# Patient Record
Sex: FEMALE | Race: BLACK OR AFRICAN AMERICAN | NOT HISPANIC OR LATINO | Employment: FULL TIME | URBAN - METROPOLITAN AREA
[De-identification: names, ages, dates, MRNs, and addresses within clinical notes are randomized per-mention and may not be internally consistent; named-entity substitution may affect disease eponyms.]

---

## 2023-10-30 ENCOUNTER — OFFICE VISIT (OUTPATIENT)
Dept: PRIMARY CARE | Facility: CLINIC | Age: 73
End: 2023-10-30
Payer: COMMERCIAL

## 2023-10-30 DIAGNOSIS — N95.1 MENOPAUSAL STATE: ICD-10-CM

## 2023-10-30 DIAGNOSIS — Z12.31 VISIT FOR SCREENING MAMMOGRAM: ICD-10-CM

## 2023-10-30 DIAGNOSIS — Z23 NEED FOR IMMUNIZATION AGAINST INFLUENZA: Primary | ICD-10-CM

## 2023-10-30 DIAGNOSIS — M81.0 POSTMENOPAUSAL OSTEOPOROSIS: Primary | ICD-10-CM

## 2023-10-30 PROBLEM — R63.4 UNINTENDED WEIGHT LOSS: Status: RESOLVED | Noted: 2023-02-07 | Resolved: 2023-10-30

## 2023-10-30 PROBLEM — J20.9 ACUTE BRONCHITIS: Status: ACTIVE | Noted: 2023-10-30

## 2023-10-30 PROBLEM — L30.9 ECZEMA: Status: RESOLVED | Noted: 2023-10-30 | Resolved: 2023-10-30

## 2023-10-30 PROBLEM — R09.82 POSTNASAL DRIP: Status: RESOLVED | Noted: 2023-10-30 | Resolved: 2023-10-30

## 2023-10-30 PROBLEM — M19.071 PRIMARY OSTEOARTHRITIS OF RIGHT ANKLE: Status: ACTIVE | Noted: 2023-10-30

## 2023-10-30 PROBLEM — E55.9 VITAMIN D INSUFFICIENCY: Status: ACTIVE | Noted: 2023-10-30

## 2023-10-30 PROBLEM — R73.09 ABNORMAL GLUCOSE: Status: RESOLVED | Noted: 2023-10-30 | Resolved: 2023-10-30

## 2023-10-30 PROBLEM — R49.0 HOARSENESS OF VOICE: Status: RESOLVED | Noted: 2023-10-30 | Resolved: 2023-10-30

## 2023-10-30 PROBLEM — M54.12 LEFT CERVICAL RADICULOPATHY: Status: ACTIVE | Noted: 2023-10-30

## 2023-10-30 PROBLEM — J44.9 OBSTRUCTIVE LUNG DISEASE (GENERALIZED) (MULTI): Status: ACTIVE | Noted: 2023-10-23

## 2023-10-30 PROBLEM — E03.9 HYPOTHYROID: Status: ACTIVE | Noted: 2023-10-30

## 2023-10-30 PROBLEM — M79.671 RIGHT FOOT PAIN: Status: RESOLVED | Noted: 2023-10-30 | Resolved: 2023-10-30

## 2023-10-30 PROBLEM — E66.811 OBESITY (BMI 30.0-34.9): Status: ACTIVE | Noted: 2023-10-30

## 2023-10-30 PROBLEM — M25.471 RIGHT ANKLE SWELLING: Status: RESOLVED | Noted: 2023-10-30 | Resolved: 2023-10-30

## 2023-10-30 PROBLEM — K21.9 LPRD (LARYNGOPHARYNGEAL REFLUX DISEASE): Status: ACTIVE | Noted: 2017-07-06

## 2023-10-30 PROBLEM — U09.9 CHRONIC POST-COVID-19 SYNDROME: Status: RESOLVED | Noted: 2023-02-07 | Resolved: 2023-10-30

## 2023-10-30 PROBLEM — J84.9 ILD (INTERSTITIAL LUNG DISEASE) (MULTI): Status: ACTIVE | Noted: 2023-10-23

## 2023-10-30 PROBLEM — R49.0 MUSCLE TENSION DYSPHONIA: Status: ACTIVE | Noted: 2023-10-30

## 2023-10-30 PROBLEM — I10 HTN (HYPERTENSION): Status: ACTIVE | Noted: 2023-10-30

## 2023-10-30 PROBLEM — S93.401A RIGHT ANKLE SPRAIN: Status: RESOLVED | Noted: 2023-10-30 | Resolved: 2023-10-30

## 2023-10-30 PROBLEM — R05.9 COUGH: Status: RESOLVED | Noted: 2023-10-30 | Resolved: 2023-10-30

## 2023-10-30 PROBLEM — J06.9 UPPER RESPIRATORY INFECTION: Status: RESOLVED | Noted: 2023-10-30 | Resolved: 2023-10-30

## 2023-10-30 PROBLEM — D25.1 INTRAMURAL LEIOMYOMA OF UTERUS: Status: ACTIVE | Noted: 2023-10-30

## 2023-10-30 PROBLEM — K21.9 GERD (GASTROESOPHAGEAL REFLUX DISEASE): Status: ACTIVE | Noted: 2023-10-30

## 2023-10-30 PROBLEM — M25.571 RIGHT ANKLE PAIN: Status: RESOLVED | Noted: 2023-10-30 | Resolved: 2023-10-30

## 2023-10-30 PROBLEM — E66.9 OBESITY (BMI 30.0-34.9): Status: ACTIVE | Noted: 2023-10-30

## 2023-10-30 PROBLEM — M25.561 RIGHT KNEE PAIN: Status: RESOLVED | Noted: 2023-10-30 | Resolved: 2023-10-30

## 2023-10-30 PROCEDURE — 3075F SYST BP GE 130 - 139MM HG: CPT | Performed by: INTERNAL MEDICINE

## 2023-10-30 PROCEDURE — 90662 IIV NO PRSV INCREASED AG IM: CPT | Performed by: INTERNAL MEDICINE

## 2023-10-30 PROCEDURE — 1159F MED LIST DOCD IN RCRD: CPT | Performed by: INTERNAL MEDICINE

## 2023-10-30 PROCEDURE — 3079F DIAST BP 80-89 MM HG: CPT | Performed by: INTERNAL MEDICINE

## 2023-10-30 PROCEDURE — 99211 OFF/OP EST MAY X REQ PHY/QHP: CPT | Performed by: INTERNAL MEDICINE

## 2023-10-30 PROCEDURE — 1126F AMNT PAIN NOTED NONE PRSNT: CPT | Performed by: INTERNAL MEDICINE

## 2023-10-30 PROCEDURE — G0008 ADMIN INFLUENZA VIRUS VAC: HCPCS | Performed by: INTERNAL MEDICINE

## 2023-10-30 RX ORDER — ALBUTEROL SULFATE 90 UG/1
AEROSOL, METERED RESPIRATORY (INHALATION)
COMMUNITY

## 2023-10-30 RX ORDER — LEVOTHYROXINE SODIUM 75 UG/1
75 TABLET ORAL
COMMUNITY
Start: 2014-01-16 | End: 2024-03-14

## 2023-10-30 RX ORDER — TRIAMTERENE/HYDROCHLOROTHIAZID 37.5-25 MG
1 TABLET ORAL DAILY
COMMUNITY
Start: 2023-01-19 | End: 2024-05-06

## 2023-10-30 RX ORDER — OMEPRAZOLE 20 MG/1
20 CAPSULE, DELAYED RELEASE ORAL
COMMUNITY
Start: 2023-09-08 | End: 2023-12-07

## 2023-10-30 RX ORDER — SODIUM CHLORIDE 0.65 %
1 AEROSOL, SPRAY (ML) NASAL AS NEEDED
COMMUNITY
Start: 2023-08-25 | End: 2023-11-23

## 2023-10-30 RX ORDER — ALBUTEROL SULFATE 0.83 MG/ML
2.5 SOLUTION RESPIRATORY (INHALATION)
COMMUNITY
Start: 2023-08-25

## 2023-10-30 RX ORDER — FLUTICASONE PROPIONATE 50 MCG
1 SPRAY, SUSPENSION (ML) NASAL DAILY
COMMUNITY
Start: 2019-07-17

## 2023-10-30 RX ORDER — BUDESONIDE AND FORMOTEROL FUMARATE DIHYDRATE 160; 4.5 UG/1; UG/1
2 AEROSOL RESPIRATORY (INHALATION)
COMMUNITY
Start: 2022-06-27

## 2023-10-30 RX ORDER — PREDNISONE 20 MG/1
20 TABLET ORAL DAILY
COMMUNITY
Start: 2023-09-08 | End: 2024-05-30 | Stop reason: ALTCHOICE

## 2023-10-30 RX ORDER — ALBUTEROL SULFATE 90 UG/1
2 AEROSOL, METERED RESPIRATORY (INHALATION) EVERY 6 HOURS PRN
COMMUNITY
Start: 2022-05-26 | End: 2024-05-30 | Stop reason: SDUPTHER

## 2023-10-30 RX ORDER — MONTELUKAST SODIUM 10 MG/1
1 TABLET ORAL NIGHTLY
COMMUNITY
Start: 2023-08-21 | End: 2024-05-30 | Stop reason: ALTCHOICE

## 2023-10-30 RX ORDER — MYCOPHENOLATE MOFETIL 500 MG/1
1000 TABLET ORAL 2 TIMES DAILY
COMMUNITY
Start: 2023-10-27 | End: 2024-01-25

## 2023-11-01 ENCOUNTER — TELEPHONE (OUTPATIENT)
Dept: PRIMARY CARE | Facility: CLINIC | Age: 73
End: 2023-11-01
Payer: COMMERCIAL

## 2023-11-01 DIAGNOSIS — R53.82 CHRONIC FATIGUE: ICD-10-CM

## 2023-11-01 DIAGNOSIS — E78.00 HYPERCHOLESTEROLEMIA: ICD-10-CM

## 2023-11-01 DIAGNOSIS — E03.9 ACQUIRED HYPOTHYROIDISM: Primary | ICD-10-CM

## 2023-11-01 DIAGNOSIS — I10 PRIMARY HYPERTENSION: ICD-10-CM

## 2023-11-30 ENCOUNTER — OFFICE VISIT (OUTPATIENT)
Dept: PRIMARY CARE | Facility: CLINIC | Age: 73
End: 2023-11-30
Payer: COMMERCIAL

## 2023-11-30 VITALS
RESPIRATION RATE: 16 BRPM | HEIGHT: 63 IN | HEART RATE: 101 BPM | WEIGHT: 164.24 LBS | BODY MASS INDEX: 29.1 KG/M2 | SYSTOLIC BLOOD PRESSURE: 130 MMHG | DIASTOLIC BLOOD PRESSURE: 88 MMHG | OXYGEN SATURATION: 98 %

## 2023-11-30 DIAGNOSIS — E03.9 ACQUIRED HYPOTHYROIDISM: ICD-10-CM

## 2023-11-30 DIAGNOSIS — M25.561 CHRONIC PAIN OF RIGHT KNEE: ICD-10-CM

## 2023-11-30 DIAGNOSIS — R35.0 INCREASED URINARY FREQUENCY: ICD-10-CM

## 2023-11-30 DIAGNOSIS — J84.9 ILD (INTERSTITIAL LUNG DISEASE) (MULTI): ICD-10-CM

## 2023-11-30 DIAGNOSIS — I10 PRIMARY HYPERTENSION: Primary | ICD-10-CM

## 2023-11-30 DIAGNOSIS — G89.29 CHRONIC PAIN OF RIGHT KNEE: ICD-10-CM

## 2023-11-30 LAB
POC APPEARANCE, URINE: CLEAR
POC BILIRUBIN, URINE: NEGATIVE
POC BLOOD, URINE: NEGATIVE
POC COLOR, URINE: YELLOW
POC GLUCOSE, URINE: NEGATIVE MG/DL
POC KETONES, URINE: NEGATIVE MG/DL
POC LEUKOCYTES, URINE: NEGATIVE
POC NITRITE,URINE: NEGATIVE
POC PH, URINE: 7 PH
POC PROTEIN, URINE: NEGATIVE MG/DL
POC SPECIFIC GRAVITY, URINE: 1.01
POC UROBILINOGEN, URINE: 0.2 EU/DL

## 2023-11-30 PROCEDURE — 81003 URINALYSIS AUTO W/O SCOPE: CPT | Performed by: INTERNAL MEDICINE

## 2023-11-30 PROCEDURE — 1036F TOBACCO NON-USER: CPT | Performed by: INTERNAL MEDICINE

## 2023-11-30 PROCEDURE — 99214 OFFICE O/P EST MOD 30 MIN: CPT | Performed by: INTERNAL MEDICINE

## 2023-11-30 PROCEDURE — 3079F DIAST BP 80-89 MM HG: CPT | Performed by: INTERNAL MEDICINE

## 2023-11-30 PROCEDURE — 1159F MED LIST DOCD IN RCRD: CPT | Performed by: INTERNAL MEDICINE

## 2023-11-30 PROCEDURE — 1160F RVW MEDS BY RX/DR IN RCRD: CPT | Performed by: INTERNAL MEDICINE

## 2023-11-30 PROCEDURE — 1126F AMNT PAIN NOTED NONE PRSNT: CPT | Performed by: INTERNAL MEDICINE

## 2023-11-30 PROCEDURE — 3075F SYST BP GE 130 - 139MM HG: CPT | Performed by: INTERNAL MEDICINE

## 2023-11-30 ASSESSMENT — PAIN SCALES - GENERAL: PAINLEVEL: 0-NO PAIN

## 2023-11-30 ASSESSMENT — ENCOUNTER SYMPTOMS
DEPRESSION: 0
LOSS OF SENSATION IN FEET: 0
FREQUENCY: 1
HYPERTENSION: 1
OCCASIONAL FEELINGS OF UNSTEADINESS: 0

## 2023-11-30 NOTE — PROGRESS NOTES
"Subjective   Patient ID: Zeinaboswaldo Barbour is a 73 y.o. female who presents for Hypertension and Urinary Frequency.    Follow up visit.  He has hypertension, GERD, pulmonary fibrosis, hypothyroidism.  Complains of urinary frequency.  Complains of right knee pain deformity occasional pain.    Hypertension    Urinary Frequency   Associated symptoms include frequency.        Review of Systems   Genitourinary:  Positive for frequency.   All other systems reviewed and are negative.      Objective   /88 (BP Location: Left arm, Patient Position: Sitting)   Pulse 101   Resp 16   Ht 1.605 m (5' 3.19\")   Wt 74.5 kg (164 lb 3.9 oz)   SpO2 98%   BMI 28.92 kg/m²     Physical Exam  Constitutional:       Appearance: Normal appearance.   HENT:      Head: Normocephalic and atraumatic.      Right Ear: External ear normal.      Left Ear: External ear normal.      Mouth/Throat:      Mouth: Mucous membranes are moist.      Pharynx: Oropharynx is clear.   Neck:      Vascular: No carotid bruit.   Cardiovascular:      Rate and Rhythm: Normal rate and regular rhythm.      Heart sounds: No murmur heard.     No gallop.   Pulmonary:      Effort: Pulmonary effort is normal. No respiratory distress.      Breath sounds: No wheezing or rales.   Abdominal:      General: Abdomen is flat.      Palpations: Abdomen is soft.      Hernia: No hernia is present.   Genitourinary:     Comments: Not examined  Musculoskeletal:         General: No swelling, tenderness, deformity or signs of injury.      Cervical back: No rigidity or tenderness.      Right lower leg: No edema.   Lymphadenopathy:      Cervical: No cervical adenopathy.   Skin:     Coloration: Skin is not jaundiced or pale.      Findings: No bruising, erythema, lesion or rash.   Neurological:      General: No focal deficit present.      Mental Status: She is oriented to person, place, and time.      Motor: No weakness.      Coordination: Coordination normal.   Psychiatric:         Mood " and Affect: Mood normal.         Behavior: Behavior normal.         Assessment/Plan   Problem List Items Addressed This Visit             ICD-10-CM    HTN (hypertension) - Primary I10     Hypertension : controlled blood pressure and continues same medications and educate a low salt diet do not exceed 200 mg per serving. Keep monitor the blood pressure at home.            Hypothyroid E03.9     Clinically euthyroid.  Check thyroid panel.  Continue same dose levothyroxine         ILD (interstitial lung disease) (CMS/HCC) J84.9     Follows up with pulmonary specialist at [facility].  Takes CellCept and prednisone.  Not happy about prednisone her appetite is increased cannot lose weight.         Increased urinary frequency R35.0     UA done in the office was normal.  Start Myrbetriq 25 mg daily at bedtime.  Samples given from the office.         Relevant Orders    POCT UA Automated manually resulted (Completed)     Other Visit Diagnoses         Codes    Chronic pain of right knee     M25.561, G89.29    Relevant Orders    XR knee right 1-2 views

## 2023-11-30 NOTE — ASSESSMENT & PLAN NOTE
UA done in the office was normal.  Start Myrbetriq 25 mg daily at bedtime.  Samples given from the office.

## 2023-11-30 NOTE — ASSESSMENT & PLAN NOTE
Follows up with pulmonary specialist at [facility].  Takes CellCept and prednisone.  Not happy about prednisone her appetite is increased cannot lose weight.

## 2023-11-30 NOTE — ASSESSMENT & PLAN NOTE
Hypertension : controlled blood pressure and continues same medications and educate a low salt diet do not exceed 200 mg per serving. Keep monitor the blood pressure at home.

## 2024-01-15 ENCOUNTER — TELEPHONE (OUTPATIENT)
Dept: PRIMARY CARE | Facility: CLINIC | Age: 74
End: 2024-01-15
Payer: COMMERCIAL

## 2024-01-15 DIAGNOSIS — R35.0 INCREASED URINARY FREQUENCY: Primary | ICD-10-CM

## 2024-01-17 ENCOUNTER — LAB (OUTPATIENT)
Dept: LAB | Facility: LAB | Age: 74
End: 2024-01-17
Payer: COMMERCIAL

## 2024-01-17 DIAGNOSIS — R53.82 CHRONIC FATIGUE: ICD-10-CM

## 2024-01-17 DIAGNOSIS — E03.9 ACQUIRED HYPOTHYROIDISM: ICD-10-CM

## 2024-01-17 DIAGNOSIS — I10 PRIMARY HYPERTENSION: ICD-10-CM

## 2024-01-17 DIAGNOSIS — E78.00 HYPERCHOLESTEROLEMIA: ICD-10-CM

## 2024-01-17 LAB
ANION GAP SERPL CALC-SCNC: 15 MMOL/L (ref 10–20)
BASOPHILS # BLD AUTO: 0.09 X10*3/UL (ref 0–0.1)
BASOPHILS NFR BLD AUTO: 0.9 %
BUN SERPL-MCNC: 9 MG/DL (ref 6–23)
CALCIUM SERPL-MCNC: 9.4 MG/DL (ref 8.6–10.6)
CHLORIDE SERPL-SCNC: 98 MMOL/L (ref 98–107)
CHOLEST SERPL-MCNC: 176 MG/DL (ref 0–199)
CHOLESTEROL/HDL RATIO: 3.1
CO2 SERPL-SCNC: 29 MMOL/L (ref 21–32)
CREAT SERPL-MCNC: 0.76 MG/DL (ref 0.5–1.05)
EGFRCR SERPLBLD CKD-EPI 2021: 83 ML/MIN/1.73M*2
EOSINOPHIL # BLD AUTO: 0.05 X10*3/UL (ref 0–0.4)
EOSINOPHIL NFR BLD AUTO: 0.5 %
ERYTHROCYTE [DISTWIDTH] IN BLOOD BY AUTOMATED COUNT: 12.9 % (ref 11.5–14.5)
GLUCOSE SERPL-MCNC: 80 MG/DL (ref 74–99)
HCT VFR BLD AUTO: 39.3 % (ref 36–46)
HDLC SERPL-MCNC: 56.3 MG/DL
HGB BLD-MCNC: 12.6 G/DL (ref 12–16)
IMM GRANULOCYTES # BLD AUTO: 0.16 X10*3/UL (ref 0–0.5)
IMM GRANULOCYTES NFR BLD AUTO: 1.7 % (ref 0–0.9)
LDLC SERPL CALC-MCNC: 95 MG/DL
LYMPHOCYTES # BLD AUTO: 1.58 X10*3/UL (ref 0.8–3)
LYMPHOCYTES NFR BLD AUTO: 16.6 %
MCH RBC QN AUTO: 30.8 PG (ref 26–34)
MCHC RBC AUTO-ENTMCNC: 32.1 G/DL (ref 32–36)
MCV RBC AUTO: 96 FL (ref 80–100)
MONOCYTES # BLD AUTO: 0.65 X10*3/UL (ref 0.05–0.8)
MONOCYTES NFR BLD AUTO: 6.8 %
NEUTROPHILS # BLD AUTO: 6.97 X10*3/UL (ref 1.6–5.5)
NEUTROPHILS NFR BLD AUTO: 73.5 %
NON HDL CHOLESTEROL: 120 MG/DL (ref 0–149)
NRBC BLD-RTO: 0 /100 WBCS (ref 0–0)
PLATELET # BLD AUTO: 311 X10*3/UL (ref 150–450)
POTASSIUM SERPL-SCNC: 3.4 MMOL/L (ref 3.5–5.3)
RBC # BLD AUTO: 4.09 X10*6/UL (ref 4–5.2)
SODIUM SERPL-SCNC: 139 MMOL/L (ref 136–145)
TRIGL SERPL-MCNC: 123 MG/DL (ref 0–149)
TSH SERPL-ACNC: 1.5 MIU/L (ref 0.44–3.98)
VLDL: 25 MG/DL (ref 0–40)
WBC # BLD AUTO: 9.5 X10*3/UL (ref 4.4–11.3)

## 2024-01-17 PROCEDURE — 80061 LIPID PANEL: CPT

## 2024-01-17 PROCEDURE — 80048 BASIC METABOLIC PNL TOTAL CA: CPT

## 2024-01-17 PROCEDURE — 84443 ASSAY THYROID STIM HORMONE: CPT

## 2024-01-17 PROCEDURE — 36415 COLL VENOUS BLD VENIPUNCTURE: CPT

## 2024-01-17 PROCEDURE — 85025 COMPLETE CBC W/AUTO DIFF WBC: CPT

## 2024-03-14 DIAGNOSIS — E03.9 ACQUIRED HYPOTHYROIDISM: Primary | ICD-10-CM

## 2024-03-14 RX ORDER — LEVOTHYROXINE SODIUM 75 UG/1
75 TABLET ORAL DAILY
Qty: 90 TABLET | Refills: 1 | Status: SHIPPED | OUTPATIENT
Start: 2024-03-14

## 2024-03-20 ENCOUNTER — TELEPHONE (OUTPATIENT)
Dept: PRIMARY CARE | Facility: CLINIC | Age: 74
End: 2024-03-20
Payer: COMMERCIAL

## 2024-05-06 DIAGNOSIS — I10 PRIMARY HYPERTENSION: Primary | ICD-10-CM

## 2024-05-06 RX ORDER — TRIAMTERENE/HYDROCHLOROTHIAZID 37.5-25 MG
1 TABLET ORAL DAILY
Qty: 90 TABLET | Refills: 1 | Status: SHIPPED | OUTPATIENT
Start: 2024-05-06

## 2024-05-30 ENCOUNTER — HOSPITAL ENCOUNTER (OUTPATIENT)
Dept: RADIOLOGY | Facility: CLINIC | Age: 74
Discharge: HOME | End: 2024-05-30
Payer: COMMERCIAL

## 2024-05-30 ENCOUNTER — OFFICE VISIT (OUTPATIENT)
Dept: PRIMARY CARE | Facility: CLINIC | Age: 74
End: 2024-05-30
Payer: COMMERCIAL

## 2024-05-30 VITALS
WEIGHT: 168.43 LBS | HEART RATE: 96 BPM | SYSTOLIC BLOOD PRESSURE: 129 MMHG | TEMPERATURE: 97.2 F | DIASTOLIC BLOOD PRESSURE: 70 MMHG | HEIGHT: 63 IN | RESPIRATION RATE: 16 BRPM | BODY MASS INDEX: 29.84 KG/M2 | OXYGEN SATURATION: 98 %

## 2024-05-30 DIAGNOSIS — G89.29 CHRONIC PAIN OF RIGHT KNEE: ICD-10-CM

## 2024-05-30 DIAGNOSIS — Z78.0 POSTMENOPAUSAL: ICD-10-CM

## 2024-05-30 DIAGNOSIS — J44.9 OBSTRUCTIVE LUNG DISEASE (GENERALIZED) (MULTI): ICD-10-CM

## 2024-05-30 DIAGNOSIS — M25.561 CHRONIC PAIN OF RIGHT KNEE: ICD-10-CM

## 2024-05-30 DIAGNOSIS — I10 PRIMARY HYPERTENSION: ICD-10-CM

## 2024-05-30 DIAGNOSIS — Z12.31 SCREENING MAMMOGRAM FOR HIGH-RISK PATIENT: ICD-10-CM

## 2024-05-30 DIAGNOSIS — Z00.00 MEDICARE ANNUAL WELLNESS VISIT, SUBSEQUENT: Primary | ICD-10-CM

## 2024-05-30 PROBLEM — D72.829 LEUKOCYTOSIS: Status: RESOLVED | Noted: 2024-05-30 | Resolved: 2024-05-30

## 2024-05-30 PROCEDURE — G0439 PPPS, SUBSEQ VISIT: HCPCS | Performed by: INTERNAL MEDICINE

## 2024-05-30 PROCEDURE — 3078F DIAST BP <80 MM HG: CPT | Performed by: INTERNAL MEDICINE

## 2024-05-30 PROCEDURE — 73560 X-RAY EXAM OF KNEE 1 OR 2: CPT | Mod: RIGHT SIDE | Performed by: RADIOLOGY

## 2024-05-30 PROCEDURE — 73560 X-RAY EXAM OF KNEE 1 OR 2: CPT | Mod: RT

## 2024-05-30 PROCEDURE — 1036F TOBACCO NON-USER: CPT | Performed by: INTERNAL MEDICINE

## 2024-05-30 PROCEDURE — 1126F AMNT PAIN NOTED NONE PRSNT: CPT | Performed by: INTERNAL MEDICINE

## 2024-05-30 PROCEDURE — 1159F MED LIST DOCD IN RCRD: CPT | Performed by: INTERNAL MEDICINE

## 2024-05-30 PROCEDURE — 99213 OFFICE O/P EST LOW 20 MIN: CPT | Performed by: INTERNAL MEDICINE

## 2024-05-30 PROCEDURE — 1160F RVW MEDS BY RX/DR IN RCRD: CPT | Performed by: INTERNAL MEDICINE

## 2024-05-30 PROCEDURE — 3074F SYST BP LT 130 MM HG: CPT | Performed by: INTERNAL MEDICINE

## 2024-05-30 PROCEDURE — 1170F FXNL STATUS ASSESSED: CPT | Performed by: INTERNAL MEDICINE

## 2024-05-30 RX ORDER — AZELASTINE 1 MG/ML
SPRAY, METERED NASAL
COMMUNITY
Start: 2023-11-29

## 2024-05-30 RX ORDER — CETIRIZINE HYDROCHLORIDE 10 MG/1
10 TABLET ORAL
COMMUNITY
Start: 2024-05-07

## 2024-05-30 RX ORDER — SULFAMETHOXAZOLE AND TRIMETHOPRIM 800; 160 MG/1; MG/1
1 TABLET ORAL 3 TIMES WEEKLY
COMMUNITY

## 2024-05-30 RX ORDER — MYCOPHENOLATE MOFETIL 500 MG/1
1500 TABLET ORAL 2 TIMES DAILY
COMMUNITY
Start: 2024-04-06 | End: 2024-10-03

## 2024-05-30 ASSESSMENT — ACTIVITIES OF DAILY LIVING (ADL)
DOING_HOUSEWORK: INDEPENDENT
GROCERY_SHOPPING: INDEPENDENT
TAKING_MEDICATION: INDEPENDENT
MANAGING_FINANCES: INDEPENDENT
BATHING: INDEPENDENT
DRESSING: INDEPENDENT

## 2024-05-30 ASSESSMENT — PATIENT HEALTH QUESTIONNAIRE - PHQ9
SUM OF ALL RESPONSES TO PHQ9 QUESTIONS 1 AND 2: 0
1. LITTLE INTEREST OR PLEASURE IN DOING THINGS: NOT AT ALL
2. FEELING DOWN, DEPRESSED OR HOPELESS: NOT AT ALL

## 2024-05-30 ASSESSMENT — ENCOUNTER SYMPTOMS
COUGH: 1
ARTHRALGIAS: 1
LOSS OF SENSATION IN FEET: 0
DEPRESSION: 0
OCCASIONAL FEELINGS OF UNSTEADINESS: 0

## 2024-05-30 ASSESSMENT — PAIN SCALES - GENERAL: PAINLEVEL: 0-NO PAIN

## 2024-05-30 NOTE — ASSESSMENT & PLAN NOTE
Have x-rays of the right knee.  Referral made to have physical therapy.  See orthopedic surgeon in consult.

## 2024-05-30 NOTE — PROGRESS NOTES
"Subjective   Patient ID: Zeinabelvia Barbour is a 73 y.o. female who presents for Medicare Annual Wellness Visit Subsequent.    Subsequent Medicare wellness visit.  Complains of cough for the past 5 days.  Contacted her pulmonologist prescribed azithromycin.  She uses Symbicort and albuterol inhalers.  She has hypertension hypothyroidism interstitial lung disease follows up with pulmonary specialist.  Takes  mycophenolate daily and Bactrim DS 3 times a week.  Has had has had arthroscopic surgery right knee injury in the past and now complains of right knee pain.  Complains of varicose veins right lower extremity.       Review of Systems   Respiratory:  Positive for cough.    Musculoskeletal:  Positive for arthralgias.   All other systems reviewed and are negative.      Objective   /70 (BP Location: Left arm, Patient Position: Sitting)   Pulse 96   Temp 36.2 °C (97.2 °F) (Temporal)   Resp 16   Ht 1.595 m (5' 2.8\")   Wt 76.4 kg (168 lb 6.9 oz)   SpO2 98%   BMI 30.03 kg/m²     Physical Exam  Constitutional:       Appearance: Normal appearance.   HENT:      Head: Normocephalic and atraumatic.      Right Ear: External ear normal.      Left Ear: External ear normal.      Mouth/Throat:      Mouth: Mucous membranes are moist.      Pharynx: Oropharynx is clear.   Neck:      Vascular: No carotid bruit.   Cardiovascular:      Rate and Rhythm: Normal rate and regular rhythm.      Heart sounds: No murmur heard.     No gallop.   Pulmonary:      Effort: Pulmonary effort is normal. No respiratory distress.      Breath sounds: Rales present. No wheezing.   Abdominal:      General: Abdomen is flat.      Palpations: Abdomen is soft.      Hernia: No hernia is present.   Musculoskeletal:         General: No swelling, tenderness, deformity or signs of injury.      Cervical back: No rigidity or tenderness.      Right lower leg: No edema.   Lymphadenopathy:      Cervical: No cervical adenopathy.   Skin:     Coloration: Skin is " not jaundiced or pale.      Findings: No bruising, erythema, lesion or rash.   Neurological:      General: No focal deficit present.      Mental Status: She is oriented to person, place, and time.      Motor: No weakness.      Coordination: Coordination normal.   Psychiatric:         Mood and Affect: Mood normal.         Behavior: Behavior normal.       Assessment/Plan

## 2024-05-30 NOTE — ASSESSMENT & PLAN NOTE
No recent hospitalizations.    All medications reviewed and reconciled by me the provider..  No use of controlled substances or opiates.    Family history, social history reviewed, no changes.    Patient does not smoke.    Patient does not drink.    Patient hydrates adequately daily.  Eats a well-balanced healthy diet.     Does not exercise regularly    Patient denies any difficulty with memory or cognition.     Denies any difficulty with hearing.  Patient does not wear hearing aids.    No fall risk.  No recent falls.  Denies any difficulty walking.    Patient with no history of depression anxiety, denies any loss of interest, no feeling of sadness, no lack of motivation.    Patient is independent in all ADLs and IADLs.  Independent bathing, dressing, walking.  Takes care of own finances, shopping and cooking.     Have screening mammogram and bone density scan.  Have a living will POA.  Recommend Shingrix vaccine at a local pharmacy.

## 2024-06-11 ENCOUNTER — OFFICE VISIT (OUTPATIENT)
Dept: ORTHOPEDIC SURGERY | Facility: CLINIC | Age: 74
End: 2024-06-11
Payer: COMMERCIAL

## 2024-06-11 DIAGNOSIS — G89.29 CHRONIC PAIN OF RIGHT KNEE: ICD-10-CM

## 2024-06-11 DIAGNOSIS — M17.11 PRIMARY OSTEOARTHRITIS OF RIGHT KNEE: Primary | ICD-10-CM

## 2024-06-11 DIAGNOSIS — M25.561 CHRONIC PAIN OF RIGHT KNEE: ICD-10-CM

## 2024-06-11 PROCEDURE — 1160F RVW MEDS BY RX/DR IN RCRD: CPT | Performed by: ORTHOPAEDIC SURGERY

## 2024-06-11 PROCEDURE — 99214 OFFICE O/P EST MOD 30 MIN: CPT | Performed by: ORTHOPAEDIC SURGERY

## 2024-06-11 PROCEDURE — 99204 OFFICE O/P NEW MOD 45 MIN: CPT | Performed by: ORTHOPAEDIC SURGERY

## 2024-06-11 PROCEDURE — 1036F TOBACCO NON-USER: CPT | Performed by: ORTHOPAEDIC SURGERY

## 2024-06-11 PROCEDURE — 1159F MED LIST DOCD IN RCRD: CPT | Performed by: ORTHOPAEDIC SURGERY

## 2024-06-11 NOTE — LETTER
"June 11, 2024     Sharifa Roberto MD  [address] [facility]  [facility] [address]    Patient: Zeinab Barbour   YOB: 1950   Date of Visit: 6/11/2024       Dear Dr. Sharifa Roberto MD:    Thank you for referring Zeinab Barbour to me for evaluation. Below are my notes for this consultation.  If you have questions, please do not hesitate to call me. I look forward to following your patient along with you.       Sincerely,     David Schmid MD      CC: No Recipients  ______________________________________________________________________________________    This 73-year-old woman complains of right knee deformity.  She notes she is having occasional generalized pain in the knee that does not wake her up at night.  She notes chronic swelling in the knee as well as crepitus.  She denies giving way or locking.  She denies any stiffness in the knee.    The patient does not take any medications for the knee problem as she \"is not having much discomfort or pain.  The patient notes many years ago she had fluid in the knee following an injury in a cortisone injection.  She has had no further injections.    The patient follows a home exercise program.    Further complicating the patient's situation as she tells me she has interstitial lung disease and a chronic cough following COVID infection.  Additionally the patient has a past medical history that complicates her situation that includes cervical radiculopathy, gastroesophageal reflux disease, hypertension and a BMI greater than 30.    Physical Exam:  The patient is well-nourished and well-developed and in no acute distress. The patient displayed normal mood and affect. The patient's pupils were equal, round sclerae are white. Patient's respirations appear to be regular and unlabored.     Physical examination of the right knee revealed mild effusion in the knee and there were no skin abnormalities or " "lymphangitis. The knee demonstrated varus alignment. There was no tenderness about the knee, thigh or calf. There was minimal tenderness at the medial joint space. There was no discomfort with patellofemoral compression. The knee came to within 5 degrees of full extension. Straight leg raising was without lag, flexion was to 110 degrees and ligamentous stability was full. The neurovascular examination extremity was intact.The neurological exam including motor and sensory exam was performed. The vascular examination including palpation of pulses and capillary refill of the foot was performed and determined to be intact.    Imaging:  I personally reviewed and measured the radiographs including AP and lateral views of the extremity and they revealed severe tricompartmental osteoarthritis of the knee with marked loss of the medial compartment and varus deformity.    Impression & Plan:   It is my impression this patient has severe tricompartmental osteoarthritis with varus deformity of the knee.    The patient tells me she only has \"occasional pain in the knee\" she does not want to consider surgical intervention and does not even want an intra-articular steroid injection.  She understands that we cannot correct her deformity without knee replacement surgery.    We discussed continuing exercise program for her knee.    I would be delighted to see the patient back the future should  they  have further problems.    Note dictated with [employer] software.  Completed without full type editing and sent to avoid delay.    "

## 2024-06-11 NOTE — PROGRESS NOTES
"This 73-year-old woman complains of right knee deformity.  She notes she is having occasional generalized pain in the knee that does not wake her up at night.  She notes chronic swelling in the knee as well as crepitus.  She denies giving way or locking.  She denies any stiffness in the knee.    The patient does not take any medications for the knee problem as she \"is not having much discomfort or pain.  The patient notes many years ago she had fluid in the knee following an injury in a cortisone injection.  She has had no further injections.    The patient follows a home exercise program.    Further complicating the patient's situation as she tells me she has interstitial lung disease and a chronic cough following COVID infection.  Additionally the patient has a past medical history that complicates her situation that includes cervical radiculopathy, gastroesophageal reflux disease, hypertension and a BMI greater than 30.    Physical Exam:  The patient is well-nourished and well-developed and in no acute distress. The patient displayed normal mood and affect. The patient's pupils were equal, round sclerae are white. Patient's respirations appear to be regular and unlabored.     Physical examination of the right knee revealed mild effusion in the knee and there were no skin abnormalities or lymphangitis. The knee demonstrated varus alignment. There was no tenderness about the knee, thigh or calf. There was minimal tenderness at the medial joint space. There was no discomfort with patellofemoral compression. The knee came to within 5 degrees of full extension. Straight leg raising was without lag, flexion was to 110 degrees and ligamentous stability was full. The neurovascular examination extremity was intact.The neurological exam including motor and sensory exam was performed. The vascular examination including palpation of pulses and capillary refill of the foot was performed and determined to be " "intact.    Imaging:  I personally reviewed and measured the radiographs including AP and lateral views of the extremity and they revealed severe tricompartmental osteoarthritis of the knee with marked loss of the medial compartment and varus deformity.    Impression & Plan:   It is my impression this patient has severe tricompartmental osteoarthritis with varus deformity of the knee.    The patient tells me she only has \"occasional pain in the knee\" she does not want to consider surgical intervention and does not even want an intra-articular steroid injection.  She understands that we cannot correct her deformity without knee replacement surgery.    We discussed continuing exercise program for her knee.    I would be delighted to see the patient back the future should  they  have further problems.    Note dictated with [employer] software.  Completed without full type editing and sent to avoid delay.      "

## 2024-06-28 DIAGNOSIS — Z12.31 VISIT FOR SCREENING MAMMOGRAM: ICD-10-CM

## 2024-09-15 DIAGNOSIS — E03.9 ACQUIRED HYPOTHYROIDISM: ICD-10-CM

## 2024-09-16 RX ORDER — LEVOTHYROXINE SODIUM 75 UG/1
75 TABLET ORAL DAILY
Qty: 90 TABLET | Refills: 1 | Status: SHIPPED | OUTPATIENT
Start: 2024-09-16

## 2024-10-28 DIAGNOSIS — I10 PRIMARY HYPERTENSION: ICD-10-CM

## 2024-10-28 RX ORDER — TRIAMTERENE/HYDROCHLOROTHIAZID 37.5-25 MG
1 TABLET ORAL DAILY
Qty: 90 TABLET | Refills: 0 | Status: SHIPPED | OUTPATIENT
Start: 2024-10-28

## 2024-12-02 ENCOUNTER — APPOINTMENT (OUTPATIENT)
Dept: PRIMARY CARE | Facility: CLINIC | Age: 74
End: 2024-12-02
Payer: COMMERCIAL

## 2025-01-26 DIAGNOSIS — I10 PRIMARY HYPERTENSION: ICD-10-CM

## 2025-01-27 RX ORDER — TRIAMTERENE/HYDROCHLOROTHIAZID 37.5-25 MG
1 TABLET ORAL DAILY
Qty: 90 TABLET | Refills: 1 | Status: SHIPPED | OUTPATIENT
Start: 2025-01-27

## 2025-03-15 DIAGNOSIS — E03.9 ACQUIRED HYPOTHYROIDISM: ICD-10-CM

## 2025-03-17 RX ORDER — LEVOTHYROXINE SODIUM 75 UG/1
75 TABLET ORAL DAILY
Qty: 90 TABLET | Refills: 1 | Status: SHIPPED | OUTPATIENT
Start: 2025-03-17

## 2025-05-01 ENCOUNTER — APPOINTMENT (OUTPATIENT)
Dept: PRIMARY CARE | Facility: CLINIC | Age: 75
End: 2025-05-01
Payer: COMMERCIAL

## 2025-06-18 ENCOUNTER — APPOINTMENT (OUTPATIENT)
Dept: PRIMARY CARE | Facility: CLINIC | Age: 75
End: 2025-06-18
Payer: COMMERCIAL

## 2025-07-14 ENCOUNTER — APPOINTMENT (OUTPATIENT)
Dept: PRIMARY CARE | Facility: CLINIC | Age: 75
End: 2025-07-14
Payer: COMMERCIAL

## 2025-07-23 ENCOUNTER — APPOINTMENT (OUTPATIENT)
Dept: PRIMARY CARE | Facility: CLINIC | Age: 75
End: 2025-07-23
Payer: COMMERCIAL

## 2025-07-23 VITALS
BODY MASS INDEX: 31.17 KG/M2 | WEIGHT: 175.93 LBS | RESPIRATION RATE: 16 BRPM | HEIGHT: 63 IN | DIASTOLIC BLOOD PRESSURE: 74 MMHG | OXYGEN SATURATION: 97 % | SYSTOLIC BLOOD PRESSURE: 115 MMHG | HEART RATE: 90 BPM

## 2025-07-23 DIAGNOSIS — Z00.00 MEDICARE ANNUAL WELLNESS VISIT, SUBSEQUENT: Primary | ICD-10-CM

## 2025-07-23 DIAGNOSIS — J84.9 ILD (INTERSTITIAL LUNG DISEASE) (MULTI): ICD-10-CM

## 2025-07-23 DIAGNOSIS — E78.49 FAMILIAL HYPERLIPIDEMIA, HIGH LDL: ICD-10-CM

## 2025-07-23 DIAGNOSIS — Z12.31 ENCOUNTER FOR SCREENING MAMMOGRAM FOR BREAST CANCER: ICD-10-CM

## 2025-07-23 DIAGNOSIS — Z78.0 ASYMPTOMATIC MENOPAUSAL STATE: ICD-10-CM

## 2025-07-23 PROBLEM — R35.0 INCREASED URINARY FREQUENCY: Status: RESOLVED | Noted: 2023-11-30 | Resolved: 2025-07-23

## 2025-07-23 PROBLEM — D25.1 INTRAMURAL LEIOMYOMA OF UTERUS: Status: RESOLVED | Noted: 2023-10-30 | Resolved: 2025-07-23

## 2025-07-23 PROBLEM — M54.12 LEFT CERVICAL RADICULOPATHY: Status: RESOLVED | Noted: 2023-10-30 | Resolved: 2025-07-23

## 2025-07-23 PROBLEM — J20.9 ACUTE BRONCHITIS: Status: RESOLVED | Noted: 2023-10-30 | Resolved: 2025-07-23

## 2025-07-23 PROCEDURE — 1126F AMNT PAIN NOTED NONE PRSNT: CPT | Performed by: INTERNAL MEDICINE

## 2025-07-23 PROCEDURE — 1170F FXNL STATUS ASSESSED: CPT | Performed by: INTERNAL MEDICINE

## 2025-07-23 PROCEDURE — 1159F MED LIST DOCD IN RCRD: CPT | Performed by: INTERNAL MEDICINE

## 2025-07-23 PROCEDURE — 1160F RVW MEDS BY RX/DR IN RCRD: CPT | Performed by: INTERNAL MEDICINE

## 2025-07-23 PROCEDURE — 1036F TOBACCO NON-USER: CPT | Performed by: INTERNAL MEDICINE

## 2025-07-23 PROCEDURE — 3074F SYST BP LT 130 MM HG: CPT | Performed by: INTERNAL MEDICINE

## 2025-07-23 PROCEDURE — 3008F BODY MASS INDEX DOCD: CPT | Performed by: INTERNAL MEDICINE

## 2025-07-23 PROCEDURE — G0439 PPPS, SUBSEQ VISIT: HCPCS | Performed by: INTERNAL MEDICINE

## 2025-07-23 PROCEDURE — 3078F DIAST BP <80 MM HG: CPT | Performed by: INTERNAL MEDICINE

## 2025-07-23 ASSESSMENT — ENCOUNTER SYMPTOMS
HEMATURIA: 0
SPEECH DIFFICULTY: 0
CHILLS: 0
PHOTOPHOBIA: 0
PALPITATIONS: 0
DIAPHORESIS: 0
NUMBNESS: 0
STRIDOR: 0
POLYPHAGIA: 0
ADENOPATHY: 0
HEADACHES: 0
BRUISES/BLEEDS EASILY: 0
DIZZINESS: 0
COUGH: 0
DEPRESSION: 0
APPETITE CHANGE: 0
WEAKNESS: 0
DYSURIA: 0
FLANK PAIN: 0
TREMORS: 0
LOSS OF SENSATION IN FEET: 0
SEIZURES: 0
ARTHRALGIAS: 0
FATIGUE: 0
OCCASIONAL FEELINGS OF UNSTEADINESS: 0
BACK PAIN: 0

## 2025-07-23 ASSESSMENT — ACTIVITIES OF DAILY LIVING (ADL)
DRESSING: INDEPENDENT
DOING_HOUSEWORK: INDEPENDENT
BATHING: INDEPENDENT
MANAGING_FINANCES: INDEPENDENT
GROCERY_SHOPPING: INDEPENDENT
TAKING_MEDICATION: INDEPENDENT

## 2025-07-23 ASSESSMENT — PATIENT HEALTH QUESTIONNAIRE - PHQ9
2. FEELING DOWN, DEPRESSED OR HOPELESS: NOT AT ALL
1. LITTLE INTEREST OR PLEASURE IN DOING THINGS: NOT AT ALL
SUM OF ALL RESPONSES TO PHQ9 QUESTIONS 1 AND 2: 0

## 2025-07-23 ASSESSMENT — PAIN SCALES - GENERAL: PAINLEVEL_OUTOF10: 0-NO PAIN

## 2025-07-24 NOTE — ASSESSMENT & PLAN NOTE
Recommend to have Shingrix vaccine at the local pharmacy.  She has a living well.  No recent falls.  She  denies depression.  Does not smoke.  Schedule mammogram and bone density scan this year.  Last colon cancer screening test 2023 [facility].  Check fasting lipid profile.  Follow healthy diet and exercise regularly.    Follow-up in 6 months.

## 2025-07-24 NOTE — PROGRESS NOTES
"Subjective   Patient ID: Zeinaboswaldo Barbour is a 74 y.o. female who presents for Medicare Annual Wellness Visit Subsequent.    HPI     Review of Systems   Constitutional:  Negative for appetite change, chills, diaphoresis and fatigue.   HENT:  Negative for congestion, ear discharge, hearing loss, mouth sores and postnasal drip.    Eyes:  Negative for photophobia and visual disturbance.   Respiratory:  Negative for cough and stridor.    Cardiovascular:  Negative for palpitations and leg swelling.   Endocrine: Negative for cold intolerance, heat intolerance, polyphagia and polyuria.   Genitourinary:  Negative for decreased urine volume, dysuria, enuresis, flank pain and hematuria.   Musculoskeletal:  Negative for arthralgias, back pain and gait problem.   Allergic/Immunologic: Negative for food allergies and immunocompromised state.   Neurological:  Negative for dizziness, tremors, seizures, syncope, speech difficulty, weakness, numbness and headaches.   Hematological:  Negative for adenopathy. Does not bruise/bleed easily.       Objective   /74 (BP Location: Left arm, Patient Position: Sitting)   Pulse 90   Resp 16   Ht 1.595 m (5' 2.8\")   Wt 79.8 kg (175 lb 14.8 oz)   SpO2 97%   BMI 31.37 kg/m²     Physical Exam  Constitutional:       Appearance: Normal appearance.   HENT:      Head: Normocephalic.     Eyes:      Extraocular Movements: Extraocular movements intact.       Cardiovascular:      Rate and Rhythm: Regular rhythm.      Heart sounds: Normal heart sounds.   Pulmonary:      Breath sounds: Normal breath sounds.   Abdominal:      Palpations: Abdomen is soft.     Musculoskeletal:         General: Normal range of motion.      Cervical back: Normal range of motion.     Skin:     General: Skin is warm.     Neurological:      General: No focal deficit present.      Mental Status: She is oriented to person, place, and time.         Assessment/Plan   Problem List Items Addressed This Visit           " ICD-10-CM    ILD (interstitial lung disease) (Multi) J84.9    Condition started after COVID infection in 2021.  Feels much better, cough.  She follows up with pulmonary specialist.         Medicare annual wellness visit, subsequent - Primary Z00.00    Recommend to have Shingrix vaccine at the local pharmacy.  She has a living well.  No recent falls.  She  denies depression.  Does not smoke.  Schedule mammogram and bone density scan this year.  Last colon cancer screening test 2023 [facility].  Check fasting lipid profile.  Follow healthy diet and exercise regularly.    Follow-up in 6 months.         Familial hyperlipidemia, high LDL E78.49    Relevant Orders    Lipid panel     Other Visit Diagnoses         Codes      Asymptomatic menopausal state     Z78.0    Relevant Orders    XR DEXA bone density      Encounter for screening mammogram for breast cancer     Z12.31    Relevant Orders    BI mammo bilateral screening tomosynthesis

## 2025-07-24 NOTE — ASSESSMENT & PLAN NOTE
Condition started after COVID infection in 2021.  Feels much better, cough.  She follows up with pulmonary specialist.

## 2025-07-26 DIAGNOSIS — I10 PRIMARY HYPERTENSION: ICD-10-CM

## 2025-07-28 RX ORDER — TRIAMTERENE AND HYDROCHLOROTHIAZIDE 37.5; 25 MG/1; MG/1
1 TABLET ORAL DAILY
Qty: 90 TABLET | Refills: 1 | Status: SHIPPED | OUTPATIENT
Start: 2025-07-28

## 2025-08-05 ENCOUNTER — TELEPHONE (OUTPATIENT)
Dept: PRIMARY CARE | Facility: CLINIC | Age: 75
End: 2025-08-05
Payer: COMMERCIAL

## 2026-01-27 ENCOUNTER — APPOINTMENT (OUTPATIENT)
Dept: PRIMARY CARE | Facility: CLINIC | Age: 76
End: 2026-01-27
Payer: COMMERCIAL

## 2026-07-23 ENCOUNTER — APPOINTMENT (OUTPATIENT)
Dept: PRIMARY CARE | Facility: CLINIC | Age: 76
End: 2026-07-23
Payer: MEDICARE